# Patient Record
Sex: MALE
[De-identification: names, ages, dates, MRNs, and addresses within clinical notes are randomized per-mention and may not be internally consistent; named-entity substitution may affect disease eponyms.]

---

## 2019-06-18 PROBLEM — Z00.00 ENCOUNTER FOR PREVENTIVE HEALTH EXAMINATION: Status: ACTIVE | Noted: 2019-06-18

## 2019-06-19 ENCOUNTER — APPOINTMENT (OUTPATIENT)
Dept: UROLOGY | Facility: CLINIC | Age: 39
End: 2019-06-19
Payer: COMMERCIAL

## 2019-06-19 PROCEDURE — 99205 OFFICE O/P NEW HI 60 MIN: CPT

## 2019-06-19 NOTE — ASSESSMENT
[FreeTextEntry1] : hypogonadism\par isolated teratospermia\par small bilateral varicoceles\par reviewed at length\par discussed lifestyle modification\par role for varicocelectomy discussed given isolated morphological abnl and small varicoceles. would send DFI testing if further interest in proceeding\par discussed and recommended repeat IUI vs progression to IVF\par will send baseline hormones\par

## 2019-06-19 NOTE — HISTORY OF PRESENT ILLNESS
[FreeTextEntry1] : 39M  to Leta Greenwood (34F) comes in with failure to conceive over the last 1 year. negative female factor evaluation. no ED. no ejaculatory dysfunction. good libido. no voiding complaints. no dysuria. no hematuria. no fevers chills. no nausea vomiing. no testicular pain. no family history kidney bladder prostate cancer. \par \par Semen analysis:\par 4/8/19:\par 1.0ml/117.5 mil/ml/59% motility/0.5% morphology\par \par IUI x 1 (2 SA)\par 6/1/19:\par 2ml/180 mil/ml/56% motlilty - 42% sperm recovery\par 6/2/19:\par 1ml/119.5 mil/ml/33.1% motlioty/86% sperm recovery\par \par no additional complaints.

## 2019-06-19 NOTE — PHYSICAL EXAM
[General Appearance - Well Developed] : well developed [General Appearance - Well Nourished] : well nourished [Normal Appearance] : normal appearance [Heart Rate And Rhythm] : Heart rate and rhythm were normal [Well Groomed] : well groomed [Abdomen Soft] : soft [] : no respiratory distress [Abdomen Hernia] : no hernia was discovered [Abdomen Tenderness] : non-tender [Costovertebral Angle Tenderness] : no ~M costovertebral angle tenderness [Urethral Meatus] : meatus normal [Urinary Bladder Findings] : the bladder was normal on palpation [Penis Abnormality] : normal circumcised penis [Scrotum] : the scrotum was normal [Epididymis] : the epididymides were normal [Testes Mass (___cm)] : there were no testicular masses [Testes Tenderness] : no tenderness of the testes [Normal Station and Gait] : the gait and station were normal for the patient's age [FreeTextEntry1] : grade I bilateral varicocele. 14cc firm testes. circ phallus [Skin Color & Pigmentation] : normal skin color and pigmentation [No Focal Deficits] : no focal deficits [Oriented To Time, Place, And Person] : oriented to person, place, and time [No Palpable Adenopathy] : no palpable adenopathy

## 2019-06-19 NOTE — LETTER BODY
[Dear  ___] : Dear  [unfilled], [FreeTextEntry1] : I had the pleasure of seeing Yogesh Greenwood,  to your patient Leta Greenwood, in the office in consultation today. Please see the attached note for full details.\par \par Thank you very much for allowing me to participate in the care of this patient. If you have any questions please feel free to call me at any time. \par \par \par Sincerely yours,\par \par \par \par De Stapleton MD, MESERET\par Director, Male Fertility and Microsurgery\par  of Urology\par [facility]\par \par \par This letter has been dictated using computer software but not reviewed to expedite transmission.\par  [FreeTextEntry2] : Vita Rincon MD\par ATTN: LAB/Arun Cruz\par [facility]\par [address], [address]\[place] [address]